# Patient Record
Sex: FEMALE | Race: ASIAN | URBAN - METROPOLITAN AREA
[De-identification: names, ages, dates, MRNs, and addresses within clinical notes are randomized per-mention and may not be internally consistent; named-entity substitution may affect disease eponyms.]

---

## 2019-06-14 ENCOUNTER — OFFICE VISIT (OUTPATIENT)
Dept: FAMILY MEDICINE CLINIC | Facility: CLINIC | Age: 19
End: 2019-06-14
Payer: COMMERCIAL

## 2019-06-14 VITALS
SYSTOLIC BLOOD PRESSURE: 110 MMHG | HEIGHT: 63.5 IN | WEIGHT: 111 LBS | BODY MASS INDEX: 19.42 KG/M2 | DIASTOLIC BLOOD PRESSURE: 72 MMHG

## 2019-06-14 DIAGNOSIS — Z71.9 ENCOUNTER FOR CONSULTATION: Primary | ICD-10-CM

## 2019-06-14 DIAGNOSIS — Z71.84 COUNSELING FOR TRAVEL: ICD-10-CM

## 2019-06-14 PROCEDURE — 90471 IMMUNIZATION ADMIN: CPT | Performed by: FAMILY MEDICINE

## 2019-06-14 PROCEDURE — 90691 TYPHOID VACCINE IM: CPT | Performed by: FAMILY MEDICINE

## 2019-06-14 PROCEDURE — 99204 OFFICE O/P NEW MOD 45 MIN: CPT | Performed by: FAMILY MEDICINE

## 2019-06-14 PROCEDURE — 90715 TDAP VACCINE 7 YRS/> IM: CPT | Performed by: FAMILY MEDICINE

## 2019-06-14 PROCEDURE — 90472 IMMUNIZATION ADMIN EACH ADD: CPT | Performed by: FAMILY MEDICINE

## 2019-06-14 RX ORDER — AZITHROMYCIN 500 MG/1
TABLET, FILM COATED ORAL
Qty: 5 TABLET | Refills: 0 | Status: SHIPPED
Start: 2019-06-14 | End: 2019-07-26

## 2019-06-14 NOTE — PROGRESS NOTES
HPI:   Patient presents with:  Consult: Travel to [place] 6/23/19-7/7/2019      Christina Honeycutt is a 19year old female who presents [facility]:  Counseling, Advice and Immunizations      · Patient will be traveling to: [place]  · She will be staying fo daily in the morning Disp: 118 mL Rfl: 2   Fluocinonide 0.05 % External Solution Apply to AA of the scalp BID x 2 weeks then PRN Disp: 60 mL Rfl: 2      No past medical history on file. No past surgical history on file.   No Known Allergies   Social Histo for travel to planned destination. · Patient will be traveling to: [place]  · She will be staying for 2 weeks.  Dates of travel: Departs 6/23-7/7/19  · Purpose of travel/visit is: family vacation  · Immunizations given today: Typhoid, Tdap  · Immunizations VACCINE,VI CAPSULAR  TETANUS, DIPHTHERIA TOXOIDS AND ACELLULAR PERTUSIS VACCINE (TDAP), >7 YEARS, IM USE    Dorian Patel MD